# Patient Record
Sex: FEMALE | Race: WHITE | NOT HISPANIC OR LATINO | Employment: FULL TIME | URBAN - METROPOLITAN AREA
[De-identification: names, ages, dates, MRNs, and addresses within clinical notes are randomized per-mention and may not be internally consistent; named-entity substitution may affect disease eponyms.]

---

## 2017-06-19 LAB
HPV ABSTRACT: NORMAL
PAP-ABSTRACT: NORMAL

## 2018-12-06 ENCOUNTER — TRANSFERRED RECORDS (OUTPATIENT)
Dept: FAMILY MEDICINE | Facility: CLINIC | Age: 53
End: 2018-12-06

## 2021-07-02 ENCOUNTER — TRANSFERRED RECORDS (OUTPATIENT)
Dept: FAMILY MEDICINE | Facility: CLINIC | Age: 56
End: 2021-07-02

## 2021-07-02 LAB
HEP C HIM: NORMAL
HIV 1&2 EXT: NORMAL

## 2022-02-18 ENCOUNTER — OFFICE VISIT (OUTPATIENT)
Dept: FAMILY MEDICINE | Facility: CLINIC | Age: 57
End: 2022-02-18

## 2022-02-18 VITALS
BODY MASS INDEX: 22.68 KG/M2 | HEART RATE: 77 BPM | WEIGHT: 162 LBS | HEIGHT: 71 IN | RESPIRATION RATE: 16 BRPM | SYSTOLIC BLOOD PRESSURE: 110 MMHG | OXYGEN SATURATION: 96 % | DIASTOLIC BLOOD PRESSURE: 78 MMHG

## 2022-02-18 DIAGNOSIS — M54.31 SCIATICA OF RIGHT SIDE: Primary | ICD-10-CM

## 2022-02-18 PROBLEM — Z90.79 S/P TAH-BSO: Status: ACTIVE | Noted: 2018-06-20

## 2022-02-18 PROBLEM — G62.0 POLYNEUROPATHY DUE TO DRUG (H): Status: ACTIVE | Noted: 2018-04-30

## 2022-02-18 PROBLEM — J30.9 ALLERGIC RHINITIS: Status: ACTIVE | Noted: 2022-02-18

## 2022-02-18 PROBLEM — Z90.710 S/P TAH-BSO: Status: ACTIVE | Noted: 2018-06-20

## 2022-02-18 PROBLEM — C54.1 CARCINOMA OF ENDOMETRIUM (H): Status: ACTIVE | Noted: 2017-07-05

## 2022-02-18 PROBLEM — Z90.722 S/P TAH-BSO: Status: ACTIVE | Noted: 2018-06-20

## 2022-02-18 PROCEDURE — 99203 OFFICE O/P NEW LOW 30 MIN: CPT | Performed by: FAMILY MEDICINE

## 2022-02-18 RX ORDER — TRETINOIN 0.25 MG/G
CREAM TOPICAL
COMMUNITY
Start: 2021-06-22

## 2022-02-18 RX ORDER — CYCLOBENZAPRINE HCL 10 MG
10 TABLET ORAL 3 TIMES DAILY PRN
Qty: 30 TABLET | Refills: 0 | Status: SHIPPED | OUTPATIENT
Start: 2022-02-18 | End: 2024-03-05

## 2022-02-18 RX ORDER — SOLIFENACIN SUCCINATE 5 MG/1
5 TABLET, FILM COATED ORAL
COMMUNITY
Start: 2021-07-11 | End: 2022-10-07

## 2022-02-18 RX ORDER — SERTRALINE HYDROCHLORIDE 100 MG/1
100 TABLET, FILM COATED ORAL
COMMUNITY
Start: 2021-07-01 | End: 2022-09-02

## 2022-02-18 RX ORDER — TRAZODONE HYDROCHLORIDE 100 MG/1
100-200 TABLET ORAL
COMMUNITY
Start: 2021-11-29 | End: 2022-09-02

## 2022-02-18 NOTE — PATIENT INSTRUCTIONS
"  Patient Education     * Sciatica     Sciatica (\"Lumbar Radiculopathy\") causes a pain that spreads from the lower back down into the buttock, hip and leg. Sometimes leg pain can occur without any back pain. Sciatica is due to irritation or pressure on a spinal nerve as it comes out of the spinal canal. This is most often due to pressure on the nerve from a nearby spinal disk (the cartilage cushion between each spinal bone). Other causes include spinal stenosis (narrowing of the spinal canal) and spasm of the piriformis muscle (a muscle in the buttocks that the sciatic nerve passes through).  Sciatica may begin after a sudden twisting/bending force (such as in a car accident), or sometimes after a simple awkward movement. In either case, muscle spasm is commonly present and can add to the pain.  The diagnosis of sciatica is made from the symptoms and physical exam. Unless you had a physical injury (such as a car accident or fall), X-rays are usually not ordered for the initial evaluation of sciatica because the nerves and disks cannot be seen on an X-ray. Most sciatica (80-90%) gets better with time.  What can I do about my low back pain?  There are three main things you can do to ease low back pain and help it go away.    Stay active! Use positions, movements and exercises. Too much rest can make your symptoms worse.    Use heat or cold packs.    Take medicine as directed.  Using positions, movements and exercises  Research tells us that moving your joints and muscles can help you recover from back pain. Such activity should be simple and gentle.  Use walking to help relieve your discomfort. Try taking a short walk every 3 to 4 hours during the day. Walk for a few minutes inside your home or take longer walks outside, on a treadmill or at a mall. Slowly increase the amount of time you walk. Expect discomfort when you begin, but it should lessen as your back starts to recover.  Finding a position that is " comfortable  When your back pain is new, you may find that certain positions will ease your pain. Gently try each of the following positions until you find one that eases your pain. Once you find a position of comfort, use it as often as you like while you recover. Return to your daily routine as soon as possible.    Lie on your back with your legs bent. You can do this by placing a pillow under your knees or lie on the floor and rest your lower legs on the seat of a chair.    Lie on your side with your knees bent and place a pillow between your knees.    Lie on your stomach over pillows.  Using heat or cold packs  Try cold packs or gentle heat to ease your pain. Use whichever gives you the most relief. Apply the cold pack or heat for 15 minutes at a time, as often as needed.  Taking medicine  If taking over-the-counter medicine:    Take ibuprofen (Advil, Motrin) 600 mg. three times a day as needed for pain.  OR    Take Aleve (naproxen sodium) 220 to 440 mg. two times a day as needed for pain.  If your doctor prescribed medicine, follow the instructions. Stop taking the medicine as soon as you can.  When should I call my doctor?  Your back pain should improve over the first couple of weeks. As it improves, you should be able to return to your normal activities. But call your doctor if:    You have a sudden change in your ability to control?your bladder or bowels.    You begin to feel tingling in your groin or legs.    The pain spreads down your leg and into your foot.    Your toes, feet or leg muscles begin to feel weak.    You feel generally unwell or sick.    Your pain gets worse.  For informational purposes only. Not to replace the advice of your health care provider.  Copyright   2018 [employer]. All rights reserved.
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
## 2022-03-10 ENCOUNTER — OFFICE VISIT (OUTPATIENT)
Dept: FAMILY MEDICINE | Facility: CLINIC | Age: 57
End: 2022-03-10

## 2022-03-10 VITALS
OXYGEN SATURATION: 98 % | SYSTOLIC BLOOD PRESSURE: 114 MMHG | BODY MASS INDEX: 23.01 KG/M2 | WEIGHT: 165 LBS | DIASTOLIC BLOOD PRESSURE: 84 MMHG | HEART RATE: 73 BPM

## 2022-03-10 DIAGNOSIS — M54.10 RADICULAR PAIN OF RIGHT LOWER EXTREMITY: Primary | ICD-10-CM

## 2022-03-10 DIAGNOSIS — Z90.79 S/P TAH-BSO: ICD-10-CM

## 2022-03-10 DIAGNOSIS — Z90.710 S/P TAH-BSO: ICD-10-CM

## 2022-03-10 DIAGNOSIS — M54.31 SCIATICA OF RIGHT SIDE: ICD-10-CM

## 2022-03-10 DIAGNOSIS — Z85.42 H/O MALIGNANT NEOPLASM OF ENDOMETRIUM: ICD-10-CM

## 2022-03-10 DIAGNOSIS — Z90.722 S/P TAH-BSO: ICD-10-CM

## 2022-03-10 PROCEDURE — 99214 OFFICE O/P EST MOD 30 MIN: CPT | Performed by: FAMILY MEDICINE

## 2022-03-10 RX ORDER — GABAPENTIN 100 MG/1
100 CAPSULE ORAL 3 TIMES DAILY
Qty: 60 CAPSULE | Refills: 0 | Status: SHIPPED | OUTPATIENT
Start: 2022-03-10 | End: 2022-03-29

## 2022-03-10 RX ORDER — PREDNISONE 20 MG/1
40 TABLET ORAL DAILY
Qty: 10 TABLET | Refills: 0 | Status: SHIPPED | OUTPATIENT
Start: 2022-03-10 | End: 2022-03-15

## 2022-03-10 RX ORDER — METHYLPREDNISOLONE 4 MG
TABLET, DOSE PACK ORAL
Qty: 21 TABLET | Refills: 0 | Status: CANCELLED | OUTPATIENT
Start: 2022-03-10
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
## 2022-03-29 DIAGNOSIS — M54.31 SCIATICA OF RIGHT SIDE: ICD-10-CM

## 2022-03-29 DIAGNOSIS — M54.10 RADICULAR PAIN OF RIGHT LOWER EXTREMITY: ICD-10-CM

## 2022-03-29 RX ORDER — GABAPENTIN 100 MG/1
100 CAPSULE ORAL 3 TIMES DAILY
Qty: 90 CAPSULE | Refills: 0 | Status: SHIPPED | OUTPATIENT
Start: 2022-03-29 | End: 2024-03-05

## 2022-03-29 NOTE — TELEPHONE ENCOUNTER
Refill for Gabapentin  Last fill was 3/10/22 60 tablets that covers 20 days when pt is taking it 3 times daily needs 90 tablets   LOV 3/10/22  Radicular pain of right lower extremity  S/P GOMEZ-BSO  H/O malignant neoplasm of endometrium  Sciatica of right side  Right sided acute lumbosacral radiculopathy. Has MRI lumbar spine scheduled for early next week : ordered by orthopedics.    Presents for pain management today.    Continue tylenol, OTC NSAID, salon pas, TENS machine  Trial of prednisone burst (advised may provide partial pain relief) and analgesia for neurogenic pain  Discussed potential sedative effect of gabapentin : if causes excessive daytime somnolence can reserve for use at bedtime.   Present to ED with any progressive numbness, weakness or incontinence.  -     gabapentin (NEURONTIN) 100 MG capsule; Take 1 capsule (100 mg) by mouth 3 times daily  -     predniSONE (DELTASONE) 20 MG tablet; Take 2 tablets (40 mg) by mouth daily for 5 days

## 2022-04-14 ENCOUNTER — OFFICE VISIT (OUTPATIENT)
Dept: FAMILY MEDICINE | Facility: CLINIC | Age: 57
End: 2022-04-14

## 2022-04-14 VITALS
BODY MASS INDEX: 23.1 KG/M2 | WEIGHT: 165 LBS | OXYGEN SATURATION: 97 % | SYSTOLIC BLOOD PRESSURE: 122 MMHG | HEIGHT: 71 IN | DIASTOLIC BLOOD PRESSURE: 82 MMHG | HEART RATE: 87 BPM

## 2022-04-14 DIAGNOSIS — Z90.79 S/P TAH-BSO: ICD-10-CM

## 2022-04-14 DIAGNOSIS — Z90.710 S/P TAH-BSO: ICD-10-CM

## 2022-04-14 DIAGNOSIS — Z90.722 S/P TAH-BSO: ICD-10-CM

## 2022-04-14 DIAGNOSIS — Z85.42 H/O MALIGNANT NEOPLASM OF ENDOMETRIUM: ICD-10-CM

## 2022-04-14 DIAGNOSIS — R31.29 MICROSCOPIC HEMATURIA: Primary | ICD-10-CM

## 2022-04-14 LAB
BACTERIA URINE: 0
BILIRUB UR QL STRIP: ABNORMAL
BLOOD URINE DIP: ABNORMAL
CASTS/LPF: 0
COLOR UR: YELLOW
CRYSTAL URINE: 0
EPITHELIAL CELLS - QUEST: ABNORMAL
GLUCOSE UR STRIP-MCNC: ABNORMAL MG/DL
KETONES UR QL STRIP: ABNORMAL
LEUKOCYTE ESTERASE URINE DIP: ABNORMAL
MUCOUS URINE: 0
NITRITE UR QL STRIP: ABNORMAL
PH UR STRIP: 7 PH (ref 5–9)
PROT UR QL: ABNORMAL MG/DL (ref ?–0.01)
RBC URINE: 0.3 (ref 0–3)
SP GR UR STRIP: 1.01 (ref 1–1.02)
UROBILINOGEN UR QL STRIP: 0.2 EU/DL (ref 0.2–1)
WBC URINE: ABNORMAL (ref 0–3)

## 2022-04-14 PROCEDURE — 81003 URINALYSIS AUTO W/O SCOPE: CPT | Performed by: FAMILY MEDICINE

## 2022-04-14 PROCEDURE — 99214 OFFICE O/P EST MOD 30 MIN: CPT | Performed by: FAMILY MEDICINE

## 2022-04-14 NOTE — PROGRESS NOTES
"SUBJECTIVE:    Shelby Suazo, is a 56 year old female with Hx of stage IV grade 2 endometrial cancer now in remission for 4 years presenting for the below:     1. Urgent care follow up. Seen at [place] urgent care (those outside records are not available at the time of writing) 3/25 with lower abdominal bloating and discomfort associated with offensive smelling / stong urine. No associated dysuria, frequency, urgency.  As per patient report, trace blood in urine only. Was issued with Abx from urgent care (patient thinks this was Keflex). Called by urgent care 2 days later and informed urine culture with no growth. Stopped taking Abx. Lower abdominal discomfort has persisted intermittent. She is particularly concerned as her endometrial cancer was initially identified through microscopic hematuria finding.     Most recent surveillance scan June 2021 : continued to be in remission at that time. Has 6 month follow up next week at [facility], 4/20 being seen by Elida Antonio NP.     OBJECTIVE:  Vitals:    04/14/22 1048   BP: 122/82   Pulse: 87   SpO2: 97%   Weight: 74.8 kg (165 lb)   Height: 1.803 m (5' 11\")    Body mass index is 23.01 kg/m .    General: no acute distress, cooperative with exam.  Psych: mental status normal, mood and affect appropriate.    UA RESULTS:  Recent Labs   Lab Test 04/14/22  0000   COLOR Yellow   SG 1.015   URINEPH 7.0   UROBILINOGEN 0.2   NITRITE Neg   RBCU 0.3*   WBCU rare         ASSESSMENT / PLAN:        Microscopic hematuria  S/P GOMEZ-BSO  H/O malignant neoplasm of endometrium    56 y.o. female with Hx of stage IV grade 2 endometrial cancer now in remission for 4 years presenting with isolated persistent microscopic hematuria. Urine culture with no growth. Patient is particularly concerned for neoplastic recurrence as microscopic hematuria was initial presenting sign ultimately leading to Dx of endometrial cancer. Most recent surveillance scan June 2021 : continued to be in remission at that " time.  Next interval surveillance imaging due November time (at 5 year rosita).  Together we called her [facility] care team and spoke with team member. Patient to keep appointment at [facility] next week 4/20.      -     Urinalysis ([facility])

## 2022-04-19 NOTE — PROGRESS NOTES
4/14/22 Faxed this office note with 4/14/22 lab results to [facility]ology department @ [phone]    Yemi Nieto,   [facility]  [phone]

## 2022-05-03 ENCOUNTER — TRANSFERRED RECORDS (OUTPATIENT)
Dept: FAMILY MEDICINE | Facility: CLINIC | Age: 57
End: 2022-05-03

## 2022-07-19 DIAGNOSIS — G47.00 INSOMNIA: Primary | ICD-10-CM

## 2022-07-19 RX ORDER — TRAZODONE HYDROCHLORIDE 100 MG/1
150 TABLET ORAL AT BEDTIME
Qty: 5 TABLET | Refills: 0 | Status: SHIPPED | OUTPATIENT
Start: 2022-07-19 | End: 2022-09-02

## 2022-07-19 NOTE — TELEPHONE ENCOUNTER
Patient calls requesting small quantity of trazodone 100mg tabs be sent to pharmacy in [facility]. Is there visiting daughter. They are on a short trip within [place] and patient forgot her pills at daughter's home. States takes 150mg q hs and would like 5 tabs sent if possible.   Last visit: 4/14/22 with Dr. Rob     Plan: okay per Dr. Reddy (oncall for Dr. Rob) to sent 5 tabs trazodone to pharmacy per patient request. This was done. Patient appreciative.   Alie Gipson RN

## 2022-08-30 NOTE — PROGRESS NOTES
. Female Preventive Health Visit    SUBJECTIVE:    This 57 year old female presenting for a routine preventive physical exam.    The patient has the following concerns:     1. H/o stage 4 endometrial cancer. S/p hysterectomy with BSO July 2017.  Has 5 year final follow up [facility] in December : will be discharge to primary care after this. Needs to continue with annual vaginal vault PAPs.      2. Hair loss with chemotherapy. Has been using topical minoxidil with good effect and hair regrowth. Interested in TSH testing today.    3. GIBRAN : Zoloft 100 mg daily. 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 Would like to increase ssri dose.     Patient's medications, allergies, past medical, surgical and family histories were reviewed and updated as appropriate.    OB/Gyn History:      Pap Smear: due annual vaginal vault PAP.      Health Maintenance:  Health maintenance alerts were reviewed and updated as appropriate.  Breast cancer screening: Due.   Osteoporosis screening:  Aug 2019 : Mildly low bone density (osteopenia) based on the lowest T-score at the lumbar spine. Repeat in 5 years.     Colorectal cancer screening: colonscopy at age 50.       Healthy Habits:    Do you get at least three servings of calcium containing foods daily (dairy, green leafy vegetables, etc.)? no, taking calcium and/or vitamin D supplement    Amount of exercise or daily activities, outside of work: 3 day(s) per week    Problems taking medications regularly No    Medication side effects: No    Have you had an eye exam in the past two years? yes    Do you see a dentist twice per year? yes    Do you have sleep apnea, excessive snoring or daytime drowsiness?no    Hearing Screening:  Right Ear  4000Hz: pass  2000Hz: pass  1000Hz: pass  500Hz: pass    Left Ear  4000Hz: pass  2000Hz: pass  1000Hz: pass         500Hz: pass      OBJECTIVE:    Vitals:    09/02/22 1046   BP: 114/78   Pulse: 63  "  Resp: 16   SpO2: 98%   Weight: 73.1 kg (161 lb 2 oz)   Height: 1.791 m (5' 10.5\")    Body mass index is 22.79 kg/m .    General: no acute distress, cooperative with exam.  HEENT:  PERRLA. Bilateral TM's, external canals, oropharynx normal.    Neck:  Supple, no lymphadenopathy or thyromegaly   Breasts: breasts appear normal, no suspicious masses, no skin or nipple changes  Lungs: clear to auscultation bilaterally, normal respiratory effort.  Heart:  RRR without murmurs, rubs or gallops.  Normal S1 and S2.  Abdomen: normal bowel sounds, nontender, no palpable organomegaly.  Genitourinary: normal external genitalia, vulva, vagina.   Skin:  No lesions.  No rashes.  Extremities: warm, perfused, no swelling or edema.  Neuro:  CN II-XII intact, motor & sensory function all intact.    Psych: mental status normal, mood and affect appropriate.    PHQ 9/2/2022   PHQ-9 Total Score 1   Q9: Thoughts of better off dead/self-harm past 2 weeks Not at all       ASSESSMENT / PLAN:  This 57 year old female presents for a routine preventive physical exam. Preventive health topics discussed including adequate exercise and healthy diet. Return to clinic in one year for preventative exam or sooner with any other concerns.  Other issues discussed as noted below.        Routine general medical examination at a health care facility  -     VENOUS COLLECTION  -     Hemoglobin A1C ([facility])  -     Lipid Panel ([facility])    Primary insomnia  -     traZODone (DESYREL) 100 MG tablet; Take 1.5 tablets (150 mg) by mouth At Bedtime    Breast cancer screening by mammogram  -     MA Screen Bilateral w/[facility]; Future    Loss of hair  -     TSH ([facility])    GIBRAN (generalized anxiety disorder)  After discussion will increase Zoloft to 150 mg daily.   -     sertraline (ZOLOFT) 100 MG tablet; Take 1.5 tablets (150 mg) by mouth daily for 90 days    H/O malignant neoplasm of endometrium  S/P GOMEZ-BSO  Has 5 year final follow up [facility] in December : will be " discharge to primary care after this. Needs to continue with annual vaginal vault PAPs.  Completed today.   -     Pap IG HPV HR and LR ([facility])

## 2022-09-02 ENCOUNTER — OFFICE VISIT (OUTPATIENT)
Dept: FAMILY MEDICINE | Facility: CLINIC | Age: 57
End: 2022-09-02

## 2022-09-02 VITALS
OXYGEN SATURATION: 98 % | DIASTOLIC BLOOD PRESSURE: 78 MMHG | HEIGHT: 71 IN | RESPIRATION RATE: 16 BRPM | BODY MASS INDEX: 22.56 KG/M2 | HEART RATE: 63 BPM | WEIGHT: 161.13 LBS | SYSTOLIC BLOOD PRESSURE: 114 MMHG

## 2022-09-02 DIAGNOSIS — F51.01 PRIMARY INSOMNIA: ICD-10-CM

## 2022-09-02 DIAGNOSIS — Z12.31 BREAST CANCER SCREENING BY MAMMOGRAM: ICD-10-CM

## 2022-09-02 DIAGNOSIS — Z00.00 ROUTINE GENERAL MEDICAL EXAMINATION AT A HEALTH CARE FACILITY: Primary | ICD-10-CM

## 2022-09-02 DIAGNOSIS — L65.9 LOSS OF HAIR: ICD-10-CM

## 2022-09-02 DIAGNOSIS — F41.1 GAD (GENERALIZED ANXIETY DISORDER): ICD-10-CM

## 2022-09-02 DIAGNOSIS — Z90.79 S/P TAH-BSO: ICD-10-CM

## 2022-09-02 DIAGNOSIS — Z90.722 S/P TAH-BSO: ICD-10-CM

## 2022-09-02 DIAGNOSIS — Z90.710 S/P TAH-BSO: ICD-10-CM

## 2022-09-02 DIAGNOSIS — Z85.42 H/O MALIGNANT NEOPLASM OF ENDOMETRIUM: ICD-10-CM

## 2022-09-02 PROBLEM — C54.1 CARCINOMA OF ENDOMETRIUM (H): Status: ACTIVE | Noted: 2017-07-05

## 2022-09-02 PROCEDURE — 36415 COLL VENOUS BLD VENIPUNCTURE: CPT | Performed by: FAMILY MEDICINE

## 2022-09-02 PROCEDURE — 99396 PREV VISIT EST AGE 40-64: CPT | Performed by: FAMILY MEDICINE

## 2022-09-02 PROCEDURE — 99214 OFFICE O/P EST MOD 30 MIN: CPT | Mod: 25 | Performed by: FAMILY MEDICINE

## 2022-09-02 RX ORDER — SERTRALINE HYDROCHLORIDE 100 MG/1
150 TABLET, FILM COATED ORAL DAILY
Qty: 135 TABLET | Refills: 3 | Status: SHIPPED | OUTPATIENT
Start: 2022-09-02 | End: 2023-09-07

## 2022-09-02 RX ORDER — GABAPENTIN 100 MG/1
100 CAPSULE ORAL 3 TIMES DAILY
Qty: 90 CAPSULE | Refills: 0 | Status: CANCELLED | OUTPATIENT
Start: 2022-09-02

## 2022-09-02 RX ORDER — TRAZODONE HYDROCHLORIDE 100 MG/1
150 TABLET ORAL AT BEDTIME
Qty: 135 TABLET | Refills: 1 | Status: SHIPPED | OUTPATIENT
Start: 2022-09-02 | End: 2023-09-27

## 2022-09-02 RX ORDER — POLYETHYLENE GLYCOL 3350 17 G/17G
POWDER, FOR SOLUTION ORAL
COMMUNITY

## 2022-09-02 RX ORDER — BIMATOPROST 3 UG/ML
1 SOLUTION TOPICAL
COMMUNITY

## 2022-09-02 ASSESSMENT — ANXIETY QUESTIONNAIRES
7. FEELING AFRAID AS IF SOMETHING AWFUL MIGHT HAPPEN: NOT AT ALL
5. BEING SO RESTLESS THAT IT IS HARD TO SIT STILL: NOT AT ALL
1. FEELING NERVOUS, ANXIOUS, OR ON EDGE: NOT AT ALL
2. NOT BEING ABLE TO STOP OR CONTROL WORRYING: NOT AT ALL
3. WORRYING TOO MUCH ABOUT DIFFERENT THINGS: NOT AT ALL
GAD7 TOTAL SCORE: 0
GAD7 TOTAL SCORE: 0
6. BECOMING EASILY ANNOYED OR IRRITABLE: NOT AT ALL

## 2022-09-02 ASSESSMENT — PATIENT HEALTH QUESTIONNAIRE - PHQ9
SUM OF ALL RESPONSES TO PHQ QUESTIONS 1-9: 1
5. POOR APPETITE OR OVEREATING: NOT AT ALL

## 2022-09-03 LAB
CHOLEST SERPL-MCNC: 167 MG/DL (ref 100–199)
HBA1C MFR BLD: 5.7 % (ref 4.8–5.6)
HDLC SERPL-MCNC: 56 MG/DL
LDL/HDL RATIO: 1.7 RATIO (ref 0–3.2)
LDLC SERPL CALC-MCNC: 95 MG/DL (ref 0–99)
TRIGL SERPL-MCNC: 86 MG/DL (ref 0–149)
TSH BLD-ACNC: 3.94 UIU/ML (ref 0.45–4.5)
VLDLC SERPL CALC-MCNC: 16 MG/DL (ref 5–40)

## 2022-09-09 LAB
.: NORMAL
CLINICIAN PROVIDED ICD10: NORMAL
DIAGNOSIS:: NORMAL
HPV APTIMA: NEGATIVE
Lab: NORMAL
Lab: NORMAL
PERFORMED BY:: NORMAL
SPECIMEN ADEQUACY:: NORMAL
TEST METHODOLOGY:: NORMAL

## 2022-09-16 LAB
CHOLESTEROL (EXTERNAL): 198 MG/DL (ref 0–199)
HDLC SERPL-MCNC: 67 MG/DL (ref 39–90)
LDL CHOLESTEROL (EXTERNAL): 0 MG/DL (ref 0–0)
LDL CHOLESTEROL CALCULATED (EXTERNAL): 113 MG/DL (ref 19–130)
NON HDL CHOLESTEROL (EXTERNAL): 0 MG/DL (ref 0–0)
TRIGLYCERIDES (EXTERNAL): 90 MG/DL (ref 4–149)

## 2022-09-19 ENCOUNTER — TRANSFERRED RECORDS (OUTPATIENT)
Dept: FAMILY MEDICINE | Facility: CLINIC | Age: 57
End: 2022-09-19

## 2022-10-03 ENCOUNTER — HEALTH MAINTENANCE LETTER (OUTPATIENT)
Age: 57
End: 2022-10-03

## 2023-01-27 ENCOUNTER — TELEPHONE (OUTPATIENT)
Dept: FAMILY MEDICINE | Facility: CLINIC | Age: 58
End: 2023-01-27

## 2023-01-27 DIAGNOSIS — Z90.710 S/P TAH-BSO: Primary | ICD-10-CM

## 2023-01-27 DIAGNOSIS — Z90.722 S/P TAH-BSO: Primary | ICD-10-CM

## 2023-01-27 DIAGNOSIS — Z90.79 S/P TAH-BSO: Primary | ICD-10-CM

## 2023-01-27 DIAGNOSIS — J30.89 SEASONAL ALLERGIC RHINITIS DUE TO OTHER ALLERGIC TRIGGER: ICD-10-CM

## 2023-01-27 RX ORDER — SOLIFENACIN SUCCINATE 5 MG/1
5 TABLET, FILM COATED ORAL DAILY
Qty: 90 TABLET | Refills: 2 | Status: SHIPPED | OUTPATIENT
Start: 2023-01-27 | End: 2023-10-20

## 2023-01-27 RX ORDER — AZELASTINE 1 MG/ML
1 SPRAY, METERED NASAL 2 TIMES DAILY
Qty: 30 ML | Refills: 1 | Status: SHIPPED | OUTPATIENT
Start: 2023-01-27 | End: 2023-03-27

## 2023-01-27 NOTE — TELEPHONE ENCOUNTER
Patient called clinic requesting prescription for Vesicare and Azelastin. Patient last office visit 9/2/22 for CPX. Refills entered and routed to PCP Dr Rob for review. Cassie Ernandez

## 2023-03-27 DIAGNOSIS — J30.89 SEASONAL ALLERGIC RHINITIS DUE TO OTHER ALLERGIC TRIGGER: ICD-10-CM

## 2023-03-27 RX ORDER — AZELASTINE 1 MG/ML
1 SPRAY, METERED NASAL 2 TIMES DAILY
Qty: 30 ML | Refills: 1 | Status: SHIPPED | OUTPATIENT
Start: 2023-03-27 | End: 2023-06-05

## 2023-06-03 DIAGNOSIS — J30.89 SEASONAL ALLERGIC RHINITIS DUE TO OTHER ALLERGIC TRIGGER: ICD-10-CM

## 2023-06-05 RX ORDER — AZELASTINE HYDROCHLORIDE 137 UG/1
SPRAY, METERED NASAL
Qty: 30 ML | Refills: 3 | Status: SHIPPED | OUTPATIENT
Start: 2023-06-05 | End: 2024-02-19

## 2023-06-05 NOTE — TELEPHONE ENCOUNTER
Azelastine. LOV 9/02/22.    Seasonal allergic rhinitis due to other allergic trigger.     Per Dr Horne \"  

## 2023-08-30 ENCOUNTER — VIRTUAL VISIT (OUTPATIENT)
Dept: FAMILY MEDICINE | Facility: CLINIC | Age: 58
End: 2023-08-30

## 2023-08-30 DIAGNOSIS — R79.89 ELEVATED TSH: Primary | ICD-10-CM

## 2023-08-30 PROCEDURE — 99213 OFFICE O/P EST LOW 20 MIN: CPT | Mod: 95 | Performed by: FAMILY MEDICINE

## 2023-08-30 RX ORDER — LEVOTHYROXINE SODIUM 25 UG/1
25 TABLET ORAL DAILY
Qty: 90 TABLET | Refills: 3 | Status: SHIPPED | OUTPATIENT
Start: 2023-08-30 | End: 2023-09-27

## 2023-08-30 NOTE — PROGRESS NOTES
Length of video call : 8 mins    Patient location:  Home    Provider location:  [facility]     Mode of Communication:  Video Conference via [employer]    This was a virtual video-visit conducted during COVID-19 outbreak in regulation with social distancing and quarantine recommendations of the CDC and [place] department of health and human services. A two way audio/video connection was used in real time with patient's consent.    SUBJECTIVE:                                                 Shelby Suazo is a 58 year old female seen via video visit for the following health issues :    H/o stage 4 endometrial cancer. S/p hysterectomy with BSO July 2017.  Has completed 5 year maintenance with [facility]  Needs to continue with annual vaginal vault PAPs.      Recently seen at [facility] for mild cognitive decline (lost in prior familiar surroundings, tired all the time). Work up reassuring other than : found to have underactive thyroid. [facility] plan for neuropsychiatry testing.    TSH : mildly elevated at 6. TPO antibodies and T4 wnl. Patient interested in tying low dose synthroid.     OBJECTIVE:                                                    No vitals taken due to telehealth visit          ASSESSMENT/PLAN:                                                      Elevated TSH  Recheck TSH and symptomatology in 4 weeks at upcoming annual physical.   -     levothyroxine (SYNTHROID/LEVOTHROID) 25 MCG tablet; Take 1 tablet (25 mcg) by mouth daily

## 2023-09-07 DIAGNOSIS — F41.1 GAD (GENERALIZED ANXIETY DISORDER): ICD-10-CM

## 2023-09-07 NOTE — TELEPHONE ENCOUNTER
Med: Sertraline      LOV (related): 9/2/22      Due for F/U around: None       Next Appt: 9/27/23 with Liane               9/2/2022    12:07 PM   PHQ   PHQ-9 Total Score 1   Q9: Thoughts of better off dead/self-harm past 2 weeks Not at all           9/2/2022    12:07 PM   GIBRAN-7 SCORE   Total Score 0

## 2023-09-08 RX ORDER — SERTRALINE HYDROCHLORIDE 100 MG/1
150 TABLET, FILM COATED ORAL DAILY
Qty: 135 TABLET | Refills: 3 | Status: SHIPPED | OUTPATIENT
Start: 2023-09-08 | End: 2023-09-27

## 2023-09-23 ASSESSMENT — ENCOUNTER SYMPTOMS
DYSURIA: 0
SHORTNESS OF BREATH: 0
NERVOUS/ANXIOUS: 0
ARTHRALGIAS: 1
COUGH: 0
EYE PAIN: 0
DIZZINESS: 0
CONSTIPATION: 0
HEMATURIA: 0
ABDOMINAL PAIN: 0
DIARRHEA: 0
PARESTHESIAS: 0
SORE THROAT: 0
JOINT SWELLING: 0
NAUSEA: 0
PALPITATIONS: 0
HEARTBURN: 0
FEVER: 0
CHILLS: 0
WEAKNESS: 0
MYALGIAS: 0
HEMATOCHEZIA: 0
HEADACHES: 0
FREQUENCY: 0
BREAST MASS: 0

## 2023-09-26 NOTE — PROGRESS NOTES
. Female Preventive Health Visit    SUBJECTIVE:    This 57 year old female presenting for a routine preventive physical exam.    The patient has the following concerns:     1. H/o stage 4 endometrial cancer. S/p hysterectomy with BSO July 2017.  Has completed 5 years of surveillance with [facility] : discharged to primary care.. Needs to continue with annual vaginal vault PAPs.      2. Hypothyroidism : TSH found to be mildly elevated at 6 during work up at [facility] for congitive decline. TPO antibodies and T4 wnl. Synthroid  25 mcg daily for last 6 weeks. Due TSH recheck. Less cognitive fogginess and less aching of joints.     3. GIBRAN : Zoloft 150 mg daily. Feels stable on this. Takes 100 mg over the summer.     Patient's medications, allergies, past medical, surgical and family histories were reviewed and updated as appropriate.    OB/Gyn History:      Pap Smear: due annual vaginal vault PAP.    Health Maintenance:  Health maintenance alerts were reviewed and updated as appropriate.  Breast cancer screening: Due December 2023.   Osteoporosis screening:  Aug 2019 : Mildly low bone density (osteopenia) based on the lowest T-score at the lumbar spine. Repeat in 5 years (2024)    Colorectal cancer screening: colonscopy at age 50.     Healthy Habits:  Answers submitted by the patient for this visit:  Annual Preventive Visit (Submitted on 9/23/2023)  Chief Complaint: Annual Exam:  Frequency of exercise:: 2-3 days/week  Getting at least 3 servings of Calcium per day:: Yes  Diet:: Vegetarian/vegan  Taking medications regularly:: Yes  Bi-annual eye exam:: Yes  Dental care twice a year:: Yes  Sleep apnea or symptoms of sleep apnea:: Daytime drowsiness  arthralgias: Yes    Exercise outside of work (Submitted on 9/23/2023)  Chief Complaint: Annual Exam:  Duration of exercise:: 30-45 minutes    Hearing Screening:  Right Ear  4000Hz: pass  2000Hz: pass  1000Hz: pass  500Hz: pass    Left Ear  4000Hz: pass  2000Hz: pass  1000Hz:  "pass         500Hz: pass      OBJECTIVE:    Vitals:    09/27/23 1540   BP: (!) 151/89   Pulse: 60   SpO2: 98%   Weight: 70.4 kg (155 lb 3.2 oz)   Height: 1.778 m (5' 10\")    Body mass index is 22.27 kg/m .    General: no acute distress, cooperative with exam.  HEENT:  PERRLA. Bilateral TM's, external canals, oropharynx normal.    Neck:  Supple, no lymphadenopathy or thyromegaly   Lungs: clear to auscultation bilaterally, normal respiratory effort.  Heart:  RRR without murmurs, rubs or gallops.  Normal S1 and S2.  Abdomen: normal bowel sounds, nontender, no palpable organomegaly.  Genitourinary: normal external genitalia, vulva, vaginal vault with no lesions.   Skin:  No lesions.  No rashes.  Extremities: warm, perfused, no swelling or edema.  Neuro:  CN II-XII intact, motor & sensory function all intact.    Psych: mental status normal, mood and affect appropriate.        9/2/2022    12:07 PM   PHQ   PHQ-9 Total Score 1   Q9: Thoughts of better off dead/self-harm past 2 weeks Not at all       ASSESSMENT / PLAN:  This 57 year old female presents for a routine preventive physical exam. Preventive health topics discussed including adequate exercise and healthy diet. Return to clinic in one year for preventative exam or sooner with any other concerns.  Other issues discussed as noted below.      Routine general medical examination at a health care facility    Hypothyroidism, unspecified type   TSH found to be mildly elevated at 6 during work up at [place] [facility] for congitive decline. TPO antibodies and T4 wnl. Synthroid  25 mcg daily for last 6 weeks. Due TSH recheck. Less cognitive fogginess and less aching of joints.   -     TSH; Future  -     levothyroxine (SYNTHROID/LEVOTHROID) 25 MCG tablet; Take 1 tablet (25 mcg) by mouth daily    H/O malignant neoplasm of endometrium  Pap Smear: due annual vaginal vault PAP.  -     CBC with Diff/Plt ([facility])  -     Comprehensive metabolic panel; Future  -     Gynecologic Cytology (PAP); " Future    Screening for cardiovascular condition  -     Lipid Profile ([facility])  -     VENOUS COLLECTION    Prediabetes  -     Hemoglobin A1c; Future    Breast cancer screening by mammogram  -     MA Screen Bilateral w/[facility]; Future    Needs flu shot  -     INFLUENZA,INJ,MDCK,PF,QUAD >6MO(FLUCELVAX)    GIBRAN (generalized anxiety disorder)  -     sertraline (ZOLOFT) 100 MG tablet; Take 1.5 tablets (150 mg) by mouth daily Take 1.5 tablets (150 mg) by mouth daily for 90 days    Primary insomnia  -     traZODone (DESYREL) 100 MG tablet; Take 1.5 tablets (150 mg) by mouth At Bedtime                   not applicable (Male)

## 2023-09-27 ENCOUNTER — OFFICE VISIT (OUTPATIENT)
Dept: FAMILY MEDICINE | Facility: CLINIC | Age: 58
End: 2023-09-27

## 2023-09-27 VITALS
DIASTOLIC BLOOD PRESSURE: 89 MMHG | HEART RATE: 60 BPM | HEIGHT: 70 IN | SYSTOLIC BLOOD PRESSURE: 151 MMHG | OXYGEN SATURATION: 98 % | WEIGHT: 155.2 LBS | BODY MASS INDEX: 22.22 KG/M2

## 2023-09-27 DIAGNOSIS — Z00.00 ROUTINE GENERAL MEDICAL EXAMINATION AT A HEALTH CARE FACILITY: Primary | ICD-10-CM

## 2023-09-27 DIAGNOSIS — E03.9 HYPOTHYROIDISM, UNSPECIFIED TYPE: ICD-10-CM

## 2023-09-27 DIAGNOSIS — F51.01 PRIMARY INSOMNIA: ICD-10-CM

## 2023-09-27 DIAGNOSIS — F41.1 GAD (GENERALIZED ANXIETY DISORDER): ICD-10-CM

## 2023-09-27 DIAGNOSIS — Z12.31 BREAST CANCER SCREENING BY MAMMOGRAM: ICD-10-CM

## 2023-09-27 DIAGNOSIS — Z23 NEEDS FLU SHOT: ICD-10-CM

## 2023-09-27 DIAGNOSIS — Z13.6 SCREENING FOR CARDIOVASCULAR CONDITION: ICD-10-CM

## 2023-09-27 DIAGNOSIS — R73.03 PREDIABETES: ICD-10-CM

## 2023-09-27 DIAGNOSIS — Z85.42 H/O MALIGNANT NEOPLASM OF ENDOMETRIUM: ICD-10-CM

## 2023-09-27 LAB
% GRANULOCYTES: 62.3 % (ref 42.2–75.2)
ALBUMIN SERPL BCG-MCNC: 4.5 G/DL (ref 3.5–5.2)
ALP SERPL-CCNC: 84 U/L (ref 35–104)
ALT SERPL W P-5'-P-CCNC: 21 U/L (ref 0–50)
ANION GAP SERPL CALCULATED.3IONS-SCNC: 10 MMOL/L (ref 7–15)
AST SERPL W P-5'-P-CCNC: 25 U/L (ref 0–45)
BILIRUB SERPL-MCNC: 0.2 MG/DL
BUN SERPL-MCNC: 10.4 MG/DL (ref 8–23)
CALCIUM SERPL-MCNC: 10 MG/DL (ref 8.6–10)
CHLORIDE SERPL-SCNC: 103 MMOL/L (ref 98–107)
CHOLESTEROL: 179 MG/DL (ref 100–199)
CREAT SERPL-MCNC: 0.62 MG/DL (ref 0.51–0.95)
DEPRECATED HCO3 PLAS-SCNC: 28 MMOL/L (ref 22–29)
EGFRCR SERPLBLD CKD-EPI 2021: >90 ML/MIN/1.73M2
FASTING?: NO
GLUCOSE SERPL-MCNC: 96 MG/DL (ref 70–99)
HBA1C MFR BLD: 5.5 %
HCT VFR BLD AUTO: 36.3 % (ref 35–46)
HDL (RMG): 47 MG/DL (ref 40–?)
HEMOGLOBIN: 11.9 G/DL (ref 11.8–15.5)
LDL CALCULATED (RMG): 120 MG/DL (ref 0–130)
LYMPHOCYTES NFR BLD AUTO: 30.5 % (ref 20.5–51.1)
MCH RBC QN AUTO: 29.6 PG (ref 27–31)
MCHC RBC AUTO-ENTMCNC: 32.9 G/DL (ref 33–37)
MCV RBC AUTO: 89.8 FL (ref 80–100)
MONOCYTES NFR BLD AUTO: 7.2 % (ref 1.7–9.3)
PLATELET # BLD AUTO: 199 K/UL (ref 140–450)
POTASSIUM SERPL-SCNC: 3.8 MMOL/L (ref 3.4–5.3)
PROT SERPL-MCNC: 7 G/DL (ref 6.4–8.3)
RBC # BLD AUTO: 4.04 X10/CMM (ref 3.7–5.2)
SODIUM SERPL-SCNC: 141 MMOL/L (ref 135–145)
TRIGLYCERIDES (RMG): 56 MG/DL (ref 0–149)
TSH SERPL DL<=0.005 MIU/L-ACNC: 3.09 UIU/ML (ref 0.3–4.2)
WBC # BLD AUTO: 5 X10/CMM (ref 3.8–11)

## 2023-09-27 PROCEDURE — 84443 ASSAY THYROID STIM HORMONE: CPT | Performed by: FAMILY MEDICINE

## 2023-09-27 PROCEDURE — 87624 HPV HI-RISK TYP POOLED RSLT: CPT | Performed by: FAMILY MEDICINE

## 2023-09-27 PROCEDURE — 80053 COMPREHEN METABOLIC PANEL: CPT | Performed by: FAMILY MEDICINE

## 2023-09-27 PROCEDURE — 99396 PREV VISIT EST AGE 40-64: CPT | Performed by: FAMILY MEDICINE

## 2023-09-27 PROCEDURE — G0145 SCR C/V CYTO,THINLAYER,RESCR: HCPCS | Performed by: FAMILY MEDICINE

## 2023-09-27 PROCEDURE — 80061 LIPID PANEL: CPT | Mod: QW | Performed by: FAMILY MEDICINE

## 2023-09-27 PROCEDURE — 36415 COLL VENOUS BLD VENIPUNCTURE: CPT | Performed by: FAMILY MEDICINE

## 2023-09-27 PROCEDURE — 83036 HEMOGLOBIN GLYCOSYLATED A1C: CPT | Performed by: FAMILY MEDICINE

## 2023-09-27 PROCEDURE — 85025 COMPLETE CBC W/AUTO DIFF WBC: CPT | Performed by: FAMILY MEDICINE

## 2023-09-27 PROCEDURE — 90674 CCIIV4 VAC NO PRSV 0.5 ML IM: CPT | Performed by: FAMILY MEDICINE

## 2023-09-27 PROCEDURE — 90471 IMMUNIZATION ADMIN: CPT | Mod: 59 | Performed by: FAMILY MEDICINE

## 2023-09-27 PROCEDURE — 99214 OFFICE O/P EST MOD 30 MIN: CPT | Mod: 25 | Performed by: FAMILY MEDICINE

## 2023-09-27 RX ORDER — LEVOTHYROXINE SODIUM 25 UG/1
25 TABLET ORAL DAILY
Qty: 90 TABLET | Refills: 3 | Status: SHIPPED | OUTPATIENT
Start: 2023-09-27 | End: 2024-03-06

## 2023-09-27 RX ORDER — SERTRALINE HYDROCHLORIDE 100 MG/1
150 TABLET, FILM COATED ORAL DAILY
Qty: 135 TABLET | Refills: 3 | Status: SHIPPED | OUTPATIENT
Start: 2023-09-27

## 2023-09-27 RX ORDER — TRAZODONE HYDROCHLORIDE 100 MG/1
150 TABLET ORAL AT BEDTIME
Qty: 135 TABLET | Refills: 1 | Status: SHIPPED | OUTPATIENT
Start: 2023-09-27 | End: 2024-03-18

## 2023-10-02 LAB
BKR LAB AP GYN ADEQUACY: NORMAL
BKR LAB AP GYN INTERPRETATION: NORMAL
BKR LAB AP HPV REFLEX: NORMAL
BKR LAB AP PREVIOUS ABNORMAL: NORMAL
PATH REPORT.COMMENTS IMP SPEC: NORMAL
PATH REPORT.COMMENTS IMP SPEC: NORMAL
PATH REPORT.RELEVANT HX SPEC: NORMAL

## 2023-10-06 LAB
HUMAN PAPILLOMA VIRUS 16 DNA: NEGATIVE
HUMAN PAPILLOMA VIRUS 18 DNA: NEGATIVE
HUMAN PAPILLOMA VIRUS FINAL DIAGNOSIS: NORMAL
HUMAN PAPILLOMA VIRUS OTHER HR: NEGATIVE

## 2023-10-20 DIAGNOSIS — Z90.710 S/P TAH-BSO: ICD-10-CM

## 2023-10-20 DIAGNOSIS — Z90.79 S/P TAH-BSO: ICD-10-CM

## 2023-10-20 DIAGNOSIS — Z90.722 S/P TAH-BSO: ICD-10-CM

## 2023-10-20 RX ORDER — SOLIFENACIN SUCCINATE 5 MG/1
5 TABLET, FILM COATED ORAL DAILY
Qty: 90 TABLET | Refills: 3 | Status: SHIPPED | OUTPATIENT
Start: 2023-10-20

## 2023-10-20 NOTE — CONFIDENTIAL NOTE
Med: SOLIFENACIN    LOV (related): 9/27/23 -CPX      Due for F/U around: 9/2024 FOR CPX    Next Appt: NONE

## 2023-12-18 ENCOUNTER — ANCILLARY PROCEDURE (OUTPATIENT)
Dept: GENERAL RADIOLOGY | Facility: CLINIC | Age: 58
End: 2023-12-18
Attending: FAMILY MEDICINE
Payer: COMMERCIAL

## 2023-12-18 ENCOUNTER — OFFICE VISIT (OUTPATIENT)
Dept: FAMILY MEDICINE | Facility: CLINIC | Age: 58
End: 2023-12-18

## 2023-12-18 VITALS
DIASTOLIC BLOOD PRESSURE: 67 MMHG | HEART RATE: 78 BPM | BODY MASS INDEX: 22.1 KG/M2 | WEIGHT: 154 LBS | SYSTOLIC BLOOD PRESSURE: 135 MMHG | OXYGEN SATURATION: 97 %

## 2023-12-18 DIAGNOSIS — R07.81 RIB PAIN: ICD-10-CM

## 2023-12-18 DIAGNOSIS — N89.8 VAGINAL DISCHARGE: ICD-10-CM

## 2023-12-18 DIAGNOSIS — J45.20 MILD INTERMITTENT REACTIVE AIRWAY DISEASE WITHOUT COMPLICATION: Primary | ICD-10-CM

## 2023-12-18 DIAGNOSIS — C54.1 CARCINOMA OF ENDOMETRIUM (H): ICD-10-CM

## 2023-12-18 DIAGNOSIS — J32.0 CHRONIC MAXILLARY SINUSITIS: ICD-10-CM

## 2023-12-18 DIAGNOSIS — N92.0 SPOTTING: ICD-10-CM

## 2023-12-18 DIAGNOSIS — J45.20 MILD INTERMITTENT REACTIVE AIRWAY DISEASE WITHOUT COMPLICATION: ICD-10-CM

## 2023-12-18 LAB
CLUE CELLS: NEGATIVE
TRICHOMONAS (WET PREP): NEGATIVE
YEAST (WET PREP): NEGATIVE

## 2023-12-18 PROCEDURE — G0145 SCR C/V CYTO,THINLAYER,RESCR: HCPCS | Performed by: FAMILY MEDICINE

## 2023-12-18 PROCEDURE — 99214 OFFICE O/P EST MOD 30 MIN: CPT | Performed by: FAMILY MEDICINE

## 2023-12-18 PROCEDURE — 87624 HPV HI-RISK TYP POOLED RSLT: CPT | Performed by: FAMILY MEDICINE

## 2023-12-18 PROCEDURE — 87210 SMEAR WET MOUNT SALINE/INK: CPT | Performed by: FAMILY MEDICINE

## 2023-12-18 PROCEDURE — 71101 X-RAY EXAM UNILAT RIBS/CHEST: CPT | Mod: RT

## 2023-12-18 RX ORDER — ALBUTEROL SULFATE 90 UG/1
AEROSOL, METERED RESPIRATORY (INHALATION)
COMMUNITY
Start: 2023-11-30 | End: 2023-12-18

## 2023-12-18 RX ORDER — ALBUTEROL SULFATE 90 UG/1
AEROSOL, METERED RESPIRATORY (INHALATION)
Qty: 18 G | Refills: 0 | Status: SHIPPED | OUTPATIENT
Start: 2023-12-18

## 2023-12-18 RX ORDER — SULFAMETHOXAZOLE/TRIMETHOPRIM 800-160 MG
1 TABLET ORAL 2 TIMES DAILY
Qty: 20 TABLET | Refills: 0 | Status: SHIPPED | OUTPATIENT
Start: 2023-12-18

## 2023-12-18 NOTE — PROGRESS NOTES
Diagnosed with Stage 4 endometrial Cancer 6.5 years ago.  Had a full hysterectomy   Still had a cervix   Has spread to the lungs and intestines      Answers submitted by the patient for this visit:  General Questionnaire (Submitted on 12/18/2023)  Chief Complaint: Chronic problems general questions HPI Form  How many servings of fruits and vegetables do you eat daily?: 4 or more  On average, how many sweetened beverages do you drink each day (Examples: soda, juice, sweet tea, etc.  Do NOT count diet or artificially sweetened beverages)?: 0  How many minutes a day do you exercise enough to make your heart beat faster?: 20 to 29  How many days a week do you exercise enough to make your heart beat faster?: 5  How many days per week do you miss taking your medication?: 0  General Concern (Submitted on 12/18/2023)  Chief Complaint: Chronic problems general questions HPI Form  What is the reason for your visit today?: Vaginal bleeding pain in ribs sinus infection  When did your symptoms begin?: More than a month  What are your symptoms?: Sinus pain  How would you describe these symptoms?: Moderate  Are your symptoms:: Worsening  Have you had these symptoms before?: Yes  Have you tried or received treatment for these symptoms before?: Yes  Did that treatment work? : No  Is there anything that makes you feel worse?: Being active  Is there anything that makes you feel better?: Resting  Problem(s) Oriented visit      ROS:  General and Resp. completed and negative except as noted above     HISTORY:   reports current alcohol use.   reports that she has never smoked. She has never used smokeless tobacco.    See chart for additional current history and problem list    EXAM:  BP: 135/67   Pulse: 78    Temp: Data Unavailable    Wt Readings from Last 2 Encounters:   12/18/23 69.9 kg (154 lb)   09/27/23 70.4 kg (155 lb 3.2 oz)       BMI= Body mass index is 22.1 kg/m .    EXAM:  APPEARANCE: = Relaxed and in no distress  Resp effort =  "Calm regular breathing  Breath Sounds = Good air movement with no rales or rhonchi in any lung fields  Heart Rate, Rythym, & sounds (no Murm)  = Regular rate and rythym with no S3, S4, or murmer appreciated.   =  normal post-hysterectomy exam without masses or discharge      Assessment/Plan:  Shelby was seen today for vaginal bleeding and sinus problem.    Diagnoses and all orders for this visit:    Mild intermittent reactive airway disease without complication  Needs a refill.  -     albuterol (VENTOLIN HFA) 108 (90 Base) MCG/ACT inhaler; INHALE 1-2 PUFF AS DIRECTED EVERY FOUR TO SIX HOURS AS NEEDED    Also having pain in the right lateral mid ribs.-     XR Ribs & Chest Right G/E 3 Views; Future    Carcinoma of endometrium (H) with a little bit of Spotting/ with scant Vaginal discharge. Discharged from [facility] clinic 18 months ago after five year surveilance....    -     Wet Prep ([facility])  -     Gynecologic Cytology (PAP); Future  -     Gynecologic Cytology (PAP)    Interestingly she had significant episodes of upper resp symptoms back 6 years ago prior to cancer diagnosis  Not reaaly improving after 4 days of doxycyline. In 2-3 days if not better add the below sulfa.  Chronic maxillary sinusitis  -     sulfamethoxazole-trimethoprim (BACTRIM DS) 800-160 MG tablet; Take 1 tablet by mouth 2 times daily                COUNSELING:   reports that she has never smoked. She has never used smokeless tobacco.    Estimated body mass index is 22.1 kg/m  as calculated from the following:    Height as of 9/27/23: 1.778 m (5' 10\").    Weight as of this encounter: 69.9 kg (154 lb).       Appropriate preventive services were discussed with this patient, including applicable screening as appropriate for cardiovascular disease, diabetes, osteopenia/osteoporosis, and glaucoma.  As appropriate for age/gender, discussed screening for colorectal cancer, prostate cancer, breast cancer, and cervical cancer. Checklist reviewing preventive " services available has been given to the patient.    Reviewed patients plan of care and provided an AVS. The Basic Care Plan (routine screening as documented in Health Maintenance) for Shelby meets the Care Plan requirement. This Care Plan has been established and reviewed with the  Patient.      The following health maintenance items are reviewed in Epic and correct as of today:  Health Maintenance   Topic Date Due    ADVANCE CARE PLANNING  Never done    HEPATITIS B IMMUNIZATION (1 of 3 - 3-dose series) Never done    ZOSTER IMMUNIZATION (3 of 3) 08/27/2021    PHQ-2 (once per calendar year)  01/01/2023    YEARLY PREVENTIVE VISIT  09/27/2024    TSH W/FREE T4 REFLEX  09/27/2024    MAMMO SCREENING  12/19/2024    COLORECTAL CANCER SCREENING  08/05/2025    HPV TEST  09/27/2028    LIPID  09/27/2028    PAP  09/27/2028    DTAP/TDAP/TD IMMUNIZATION (3 - Td or Tdap) 07/02/2031    INFLUENZA VACCINE  Completed    COVID-19 Vaccine  Completed    Pneumococcal Vaccine: Pediatrics (0 to 5 Years) and At-Risk Patients (6 to 64 Years)  Aged Out    IPV IMMUNIZATION  Aged Out    HPV IMMUNIZATION  Aged Out    MENINGITIS IMMUNIZATION  Aged Out    RSV MONOCLONAL ANTIBODY  Aged Out    HEPATITIS C SCREENING  Discontinued    HIV SCREENING  Discontinued       Jaylen Reddy  [facility]  For any issues my office # is [phone]

## 2023-12-27 NOTE — RESULT ENCOUNTER NOTE
Dear Shelby,     I am writing to report that your included test results are as expected.    Many labs contain some results that are slightly outside of the normal range, I have reviewed any of these results and they require no changes at this time.    Jaylen Reddy MD

## 2024-02-19 DIAGNOSIS — J30.89 SEASONAL ALLERGIC RHINITIS DUE TO OTHER ALLERGIC TRIGGER: ICD-10-CM

## 2024-02-19 RX ORDER — AZELASTINE HYDROCHLORIDE 137 UG/1
1 SPRAY, METERED NASAL 2 TIMES DAILY
Qty: 30 ML | Refills: 3 | Status: SHIPPED | OUTPATIENT
Start: 2024-02-19 | End: 2024-09-18

## 2024-02-19 NOTE — CONFIDENTIAL NOTE
Med: AZELASTINE SPRAY    LOV (related): 12/18/23      Due for F/U around: 9/2024 FOR CPX    Next Appt: NONE

## 2024-03-05 ENCOUNTER — OFFICE VISIT (OUTPATIENT)
Dept: FAMILY MEDICINE | Facility: CLINIC | Age: 59
End: 2024-03-05

## 2024-03-05 VITALS
HEART RATE: 60 BPM | SYSTOLIC BLOOD PRESSURE: 134 MMHG | WEIGHT: 163.6 LBS | HEIGHT: 71 IN | DIASTOLIC BLOOD PRESSURE: 86 MMHG | OXYGEN SATURATION: 100 % | BODY MASS INDEX: 22.9 KG/M2

## 2024-03-05 DIAGNOSIS — F43.21 GRIEF REACTION: ICD-10-CM

## 2024-03-05 DIAGNOSIS — F33.0 MILD EPISODE OF RECURRENT MAJOR DEPRESSIVE DISORDER (H): ICD-10-CM

## 2024-03-05 DIAGNOSIS — E03.9 HYPOTHYROIDISM, UNSPECIFIED TYPE: Primary | ICD-10-CM

## 2024-03-05 LAB — TSH SERPL DL<=0.005 MIU/L-ACNC: 4.93 UIU/ML (ref 0.3–4.2)

## 2024-03-05 PROCEDURE — 36415 COLL VENOUS BLD VENIPUNCTURE: CPT | Performed by: FAMILY MEDICINE

## 2024-03-05 PROCEDURE — 99213 OFFICE O/P EST LOW 20 MIN: CPT | Performed by: FAMILY MEDICINE

## 2024-03-05 PROCEDURE — 84443 ASSAY THYROID STIM HORMONE: CPT | Mod: 90 | Performed by: FAMILY MEDICINE

## 2024-03-05 PROCEDURE — 84443 ASSAY THYROID STIM HORMONE: CPT | Performed by: FAMILY MEDICINE

## 2024-03-05 ASSESSMENT — PATIENT HEALTH QUESTIONNAIRE - PHQ9
SUM OF ALL RESPONSES TO PHQ QUESTIONS 1-9: 12
5. POOR APPETITE OR OVEREATING: NOT AT ALL

## 2024-03-05 ASSESSMENT — ANXIETY QUESTIONNAIRES
3. WORRYING TOO MUCH ABOUT DIFFERENT THINGS: SEVERAL DAYS
GAD7 TOTAL SCORE: 5
GAD7 TOTAL SCORE: 5
6. BECOMING EASILY ANNOYED OR IRRITABLE: SEVERAL DAYS
1. FEELING NERVOUS, ANXIOUS, OR ON EDGE: MORE THAN HALF THE DAYS
5. BEING SO RESTLESS THAT IT IS HARD TO SIT STILL: NOT AT ALL
7. FEELING AFRAID AS IF SOMETHING AWFUL MIGHT HAPPEN: NOT AT ALL
2. NOT BEING ABLE TO STOP OR CONTROL WORRYING: SEVERAL DAYS
IF YOU CHECKED OFF ANY PROBLEMS ON THIS QUESTIONNAIRE, HOW DIFFICULT HAVE THESE PROBLEMS MADE IT FOR YOU TO DO YOUR WORK, TAKE CARE OF THINGS AT HOME, OR GET ALONG WITH OTHER PEOPLE: SOMEWHAT DIFFICULT

## 2024-03-05 ASSESSMENT — ASTHMA QUESTIONNAIRES: ACT_TOTALSCORE: 25

## 2024-03-05 NOTE — PROGRESS NOTES
"SUBJECTIVE:    Shelby Suazo, is a 58 year old female presenting for the below:     1. Hypothyroidism: synthroid 25 mcg daily. Noticing brittle nails and feeling more fatigued.     2. MDD. Zoloft 150 mg daily. Currently grieving death of mother last week. Has returned to work.     OBJECTIVE:  Vitals:    03/05/24 1619   BP: 134/86   Pulse: 60   SpO2: 100%   Weight: 74.2 kg (163 lb 9.6 oz)   Height: 1.797 m (5' 10.75\")    Body mass index is 22.98 kg/m .  General: no acute distress, cooperative with exam.  Psych: mental status normal, mood and affect appropriate.      ASSESSMENT / PLAN:        Hypothyroidism, unspecified type  Synthroid 25 mcg daily. Noticing brittle nails and feeling more fatigued.   -     TSH; Future  -     VENOUS COLLECTION    Mild episode of recurrent major depressive disorder (H24)  Grief reaction  Discussed stages of grief. Reaction within normal limits at this time. Continue on Zoloft 150 mg daily. Monitor.         "

## 2024-03-06 RX ORDER — LEVOTHYROXINE SODIUM 50 UG/1
50 TABLET ORAL DAILY
Qty: 90 TABLET | Refills: 3 | Status: SHIPPED | OUTPATIENT
Start: 2024-03-06

## 2024-03-18 DIAGNOSIS — F51.01 PRIMARY INSOMNIA: ICD-10-CM

## 2024-03-18 RX ORDER — TRAZODONE HYDROCHLORIDE 100 MG/1
150 TABLET ORAL AT BEDTIME
Qty: 135 TABLET | Refills: 1 | Status: SHIPPED | OUTPATIENT
Start: 2024-03-18

## 2024-03-18 NOTE — TELEPHONE ENCOUNTER
Med: trazodone     LOV (related): 9/27/23      Due for F/U around: 1 year      Next Appt: None

## 2024-04-19 DIAGNOSIS — E03.9 HYPOTHYROIDISM, UNSPECIFIED TYPE: ICD-10-CM

## 2024-04-19 LAB — TSH SERPL DL<=0.005 MIU/L-ACNC: 3.4 UIU/ML (ref 0.3–4.2)

## 2024-04-19 PROCEDURE — 84443 ASSAY THYROID STIM HORMONE: CPT

## 2024-04-19 PROCEDURE — 36415 COLL VENOUS BLD VENIPUNCTURE: CPT

## 2024-04-19 PROCEDURE — 84443 ASSAY THYROID STIM HORMONE: CPT | Mod: 90

## 2024-09-18 DIAGNOSIS — J30.89 SEASONAL ALLERGIC RHINITIS DUE TO OTHER ALLERGIC TRIGGER: ICD-10-CM

## 2024-09-18 RX ORDER — AZELASTINE HYDROCHLORIDE 137 UG/1
1 SPRAY, METERED NASAL 2 TIMES DAILY
Qty: 30 ML | Refills: 3 | Status: SHIPPED | OUTPATIENT
Start: 2024-09-18

## 2024-10-27 DIAGNOSIS — Z90.710 S/P TAH-BSO: ICD-10-CM

## 2024-10-27 DIAGNOSIS — Z90.722 S/P TAH-BSO: ICD-10-CM

## 2024-10-27 DIAGNOSIS — Z90.79 S/P TAH-BSO: ICD-10-CM

## 2024-10-28 DIAGNOSIS — F51.01 PRIMARY INSOMNIA: ICD-10-CM

## 2024-10-28 RX ORDER — TRAZODONE HYDROCHLORIDE 100 MG/1
150 TABLET ORAL AT BEDTIME
Qty: 135 TABLET | Refills: 0 | Status: SHIPPED | OUTPATIENT
Start: 2024-10-28

## 2024-10-28 RX ORDER — SOLIFENACIN SUCCINATE 5 MG/1
5 TABLET, FILM COATED ORAL DAILY
Qty: 90 TABLET | Refills: 0 | Status: SHIPPED | OUTPATIENT
Start: 2024-10-28

## 2024-10-28 NOTE — TELEPHONE ENCOUNTER
Med: Trazodone    LOV (related): 9/23/23      Due for F/U around: 9/23/24 Overdue    Next Appt: 11/21/24 CPX

## 2024-10-28 NOTE — TELEPHONE ENCOUNTER
Med:Solifenacin    LOV (related): 09/27/2023 for CPX      Due for F/U around: 09/2024 for CPX    Next Appt: 11/21/2024

## 2024-11-20 RX ORDER — SULFAMETHOXAZOLE AND TRIMETHOPRIM 800; 160 MG/1; MG/1
1 TABLET ORAL 2 TIMES DAILY
Qty: 20 TABLET | Refills: 0 | Status: CANCELLED | OUTPATIENT
Start: 2024-11-20

## 2024-11-20 SDOH — HEALTH STABILITY: PHYSICAL HEALTH: ON AVERAGE, HOW MANY DAYS PER WEEK DO YOU ENGAGE IN MODERATE TO STRENUOUS EXERCISE (LIKE A BRISK WALK)?: 3 DAYS

## 2024-11-20 SDOH — HEALTH STABILITY: PHYSICAL HEALTH: ON AVERAGE, HOW MANY MINUTES DO YOU ENGAGE IN EXERCISE AT THIS LEVEL?: 30 MIN

## 2024-11-20 ASSESSMENT — SOCIAL DETERMINANTS OF HEALTH (SDOH): HOW OFTEN DO YOU GET TOGETHER WITH FRIENDS OR RELATIVES?: TWICE A WEEK

## 2024-11-20 NOTE — PATIENT INSTRUCTIONS
Patient Education   Preventive Care Advice   This is general advice given by our system to help you stay healthy. However, your care team may have specific advice just for you. Please talk to your care team about your preventive care needs.  Nutrition  Eat 5 or more servings of fruits and vegetables each day.  Try wheat bread, brown rice and whole grain pasta (instead of white bread, rice, and pasta).  Get enough calcium and vitamin D. Check the label on foods and aim for 100% of the RDA (recommended daily allowance).  Lifestyle  Exercise at least 150 minutes each week  (30 minutes a day, 5 days a week).  Do muscle strengthening activities 2 days a week. These help control your weight and prevent disease.  No smoking.  Wear sunscreen to prevent skin cancer.  Have a dental exam and cleaning every 6 months.  Yearly exams  See your health care team every year to talk about:  Any changes in your health.  Any medicines your care team has prescribed.  Preventive care, family planning, and ways to prevent chronic diseases.  Shots (vaccines)   HPV shots (up to age 26), if you've never had them before.  Hepatitis B shots (up to age 59), if you've never had them before.  COVID-19 shot: Get this shot when it's due.  Flu shot: Get a flu shot every year.  Tetanus shot: Get a tetanus shot every 10 years.  Pneumococcal, hepatitis A, and RSV shots: Ask your care team if you need these based on your risk.  Shingles shot (for age 50 and up)  General health tests  Diabetes screening:  Starting at age 35, Get screened for diabetes at least every 3 years.  If you are younger than age 35, ask your care team if you should be screened for diabetes.  Cholesterol test: At age 39, start having a cholesterol test every 5 years, or more often if advised.  Bone density scan (DEXA): At age 50, ask your care team if you should have this scan for osteoporosis (brittle bones).  Hepatitis C: Get tested at least once in your life.  STIs (sexually  transmitted infections)  Before age 24: Ask your care team if you should be screened for STIs.  After age 24: Get screened for STIs if you're at risk. You are at risk for STIs (including HIV) if:  You are sexually active with more than one person.  You don't use condoms every time.  You or a partner was diagnosed with a sexually transmitted infection.  If you are at risk for HIV, ask about PrEP medicine to prevent HIV.  Get tested for HIV at least once in your life, whether you are at risk for HIV or not.  Cancer screening tests  Cervical cancer screening: If you have a cervix, begin getting regular cervical cancer screening tests starting at age 21.  Breast cancer scan (mammogram): If you've ever had breasts, begin having regular mammograms starting at age 40. This is a scan to check for breast cancer.  Colon cancer screening: It is important to start screening for colon cancer at age 45.  Have a colonoscopy test every 10 years (or more often if you're at risk) Or, ask your provider about stool tests like a FIT test every year or Cologuard test every 3 years.  To learn more about your testing options, visit:   .  For help making a decision, visit:   https://bit.ly/te21490.  Prostate cancer screening test: If you have a prostate, ask your care team if a prostate cancer screening test (PSA) at age 55 is right for you.  Lung cancer screening: If you are a current or former smoker ages 50 to 80, ask your care team if ongoing lung cancer screenings are right for you.  For informational purposes only. Not to replace the advice of your health care provider. Copyright   2023 [employer]. All rights reserved. Clinically reviewed by the [facility] Transitions Program. [employer] 534590 - REV 01/24.

## 2024-11-21 ENCOUNTER — OFFICE VISIT (OUTPATIENT)
Dept: FAMILY MEDICINE | Facility: CLINIC | Age: 59
End: 2024-11-21

## 2024-11-21 VITALS
SYSTOLIC BLOOD PRESSURE: 114 MMHG | DIASTOLIC BLOOD PRESSURE: 92 MMHG | OXYGEN SATURATION: 100 % | WEIGHT: 165 LBS | BODY MASS INDEX: 23.1 KG/M2 | HEIGHT: 71 IN | HEART RATE: 53 BPM

## 2024-11-21 DIAGNOSIS — J30.89 SEASONAL ALLERGIC RHINITIS DUE TO OTHER ALLERGIC TRIGGER: ICD-10-CM

## 2024-11-21 DIAGNOSIS — F41.1 GAD (GENERALIZED ANXIETY DISORDER): ICD-10-CM

## 2024-11-21 DIAGNOSIS — J45.20 MILD INTERMITTENT REACTIVE AIRWAY DISEASE WITHOUT COMPLICATION: ICD-10-CM

## 2024-11-21 DIAGNOSIS — Z00.00 ROUTINE GENERAL MEDICAL EXAMINATION AT A HEALTH CARE FACILITY: Primary | ICD-10-CM

## 2024-11-21 DIAGNOSIS — E03.9 HYPOTHYROIDISM, UNSPECIFIED TYPE: ICD-10-CM

## 2024-11-21 DIAGNOSIS — F51.01 PRIMARY INSOMNIA: ICD-10-CM

## 2024-11-21 DIAGNOSIS — Z90.79 S/P TAH-BSO: ICD-10-CM

## 2024-11-21 DIAGNOSIS — Z90.722 S/P TAH-BSO: ICD-10-CM

## 2024-11-21 DIAGNOSIS — Z23 NEED FOR SHINGLES VACCINE: ICD-10-CM

## 2024-11-21 DIAGNOSIS — Z90.710 S/P TAH-BSO: ICD-10-CM

## 2024-11-21 LAB
ANION GAP SERPL CALCULATED.3IONS-SCNC: 9 MMOL/L (ref 7–15)
BUN SERPL-MCNC: 9.6 MG/DL (ref 8–23)
CALCIUM SERPL-MCNC: 9.4 MG/DL (ref 8.8–10.4)
CHLORIDE SERPL-SCNC: 101 MMOL/L (ref 98–107)
CHOLESTEROL: 191 MG/DL (ref 100–199)
CREAT SERPL-MCNC: 0.68 MG/DL (ref 0.51–0.95)
EGFRCR SERPLBLD CKD-EPI 2021: >90 ML/MIN/1.73M2
EST. AVERAGE GLUCOSE BLD GHB EST-MCNC: 114 MG/DL
FASTING STATUS PATIENT QL REPORTED: YES
FASTING?: YES
GLUCOSE SERPL-MCNC: 88 MG/DL (ref 70–99)
HBA1C MFR BLD: 5.6 %
HCO3 SERPL-SCNC: 29 MMOL/L (ref 22–29)
HDL (RMG): 54 MG/DL (ref 40–?)
LDL CALCULATED (RMG): 120 MG/DL (ref 0–130)
POTASSIUM SERPL-SCNC: 3.7 MMOL/L (ref 3.4–5.3)
SODIUM SERPL-SCNC: 139 MMOL/L (ref 135–145)
TRIGLYCERIDES (RMG): 81 MG/DL (ref 0–149)
TSH SERPL DL<=0.005 MIU/L-ACNC: 2.36 UIU/ML (ref 0.3–4.2)

## 2024-11-21 PROCEDURE — 80048 BASIC METABOLIC PNL TOTAL CA: CPT | Performed by: FAMILY MEDICINE

## 2024-11-21 PROCEDURE — 83036 HEMOGLOBIN GLYCOSYLATED A1C: CPT | Performed by: FAMILY MEDICINE

## 2024-11-21 PROCEDURE — 82435 ASSAY OF BLOOD CHLORIDE: CPT | Performed by: FAMILY MEDICINE

## 2024-11-21 PROCEDURE — 84443 ASSAY THYROID STIM HORMONE: CPT | Performed by: FAMILY MEDICINE

## 2024-11-21 RX ORDER — AZELASTINE HYDROCHLORIDE 137 UG/1
1 SPRAY, METERED NASAL 2 TIMES DAILY
Qty: 30 ML | Refills: 3 | Status: SHIPPED | OUTPATIENT
Start: 2024-11-21

## 2024-11-21 RX ORDER — SERTRALINE HYDROCHLORIDE 100 MG/1
150 TABLET, FILM COATED ORAL DAILY
Qty: 135 TABLET | Refills: 3 | Status: SHIPPED | OUTPATIENT
Start: 2024-11-21

## 2024-11-21 RX ORDER — TRAZODONE HYDROCHLORIDE 100 MG/1
150 TABLET ORAL AT BEDTIME
Qty: 135 TABLET | Refills: 3 | Status: SHIPPED | OUTPATIENT
Start: 2024-11-21

## 2024-11-21 RX ORDER — ALBUTEROL SULFATE 90 UG/1
INHALANT RESPIRATORY (INHALATION)
Qty: 18 G | Refills: 3 | Status: SHIPPED | OUTPATIENT
Start: 2024-11-21

## 2024-11-21 RX ORDER — SOLIFENACIN SUCCINATE 5 MG/1
5 TABLET, FILM COATED ORAL DAILY
Qty: 90 TABLET | Refills: 3 | Status: SHIPPED | OUTPATIENT
Start: 2024-11-21

## 2024-11-21 RX ORDER — LEVOTHYROXINE SODIUM 50 UG/1
50 TABLET ORAL DAILY
Qty: 90 TABLET | Refills: 3 | Status: SHIPPED | OUTPATIENT
Start: 2024-11-21

## 2024-11-21 ASSESSMENT — ANXIETY QUESTIONNAIRES
7. FEELING AFRAID AS IF SOMETHING AWFUL MIGHT HAPPEN: NOT AT ALL
3. WORRYING TOO MUCH ABOUT DIFFERENT THINGS: NOT AT ALL
GAD7 TOTAL SCORE: 1
1. FEELING NERVOUS, ANXIOUS, OR ON EDGE: SEVERAL DAYS
5. BEING SO RESTLESS THAT IT IS HARD TO SIT STILL: NOT AT ALL
2. NOT BEING ABLE TO STOP OR CONTROL WORRYING: NOT AT ALL
GAD7 TOTAL SCORE: 1
6. BECOMING EASILY ANNOYED OR IRRITABLE: NOT AT ALL

## 2024-11-21 ASSESSMENT — PATIENT HEALTH QUESTIONNAIRE - PHQ9
SUM OF ALL RESPONSES TO PHQ QUESTIONS 1-9: 1
5. POOR APPETITE OR OVEREATING: NOT AT ALL

## 2024-11-21 ASSESSMENT — ASTHMA QUESTIONNAIRES: ACT_TOTALSCORE: 25

## 2024-11-21 NOTE — LETTER
My Asthma Action Plan    Name: Shelby Suazo   YOB: 1965  Date: 11/21/2024   My doctor: Elodia Rob MD   My clinic: [facility]        My Rescue Medicine:   Albuterol inhaler (Proair/Ventolin/Proventil HFA)  2-4 puffs EVERY 4 HOURS as needed. Use a spacer if recommended by your provider.   My Asthma Severity:   Intermittent / Exercise Induced  Know your asthma triggers: Patient is unaware of triggers             GREEN ZONE   Good Control  I feel good  No cough or wheeze  Can work, sleep and play without asthma symptoms       Take your asthma control medicine every day.     If exercise triggers your asthma, take your rescue medication  15 minutes before exercise or sports, and  During exercise if you have asthma symptoms  Spacer to use with inhaler: If you have a spacer, make sure to use it with your inhaler             YELLOW ZONE Getting Worse  I have ANY of these:  I do not feel good  Cough or wheeze  Chest feels tight  Wake up at night   Keep taking your Green Zone medications  Start taking your rescue medicine:  every 20 minutes for up to 1 hour. Then every 4 hours for 24-48 hours.  If you stay in the Yellow Zone for more than 12-24 hours, contact your doctor.  If you do not return to the Green Zone in 12-24 hours or you get worse, start taking your oral steroid medicine if prescribed by your provider.           RED ZONE Medical Alert - Get Help  I have ANY of these:  I feel awful  Medicine is not helping  Breathing getting harder  Trouble walking or talking  Nose opens wide to breathe       Take your rescue medicine NOW  If your provider has prescribed an oral steroid medicine, start taking it NOW  Call your doctor NOW  If you are still in the Red Zone after 20 minutes and you have not reached your doctor:  Take your rescue medicine again and  Call 911 or go to the emergency room right away    See your regular doctor within 2 weeks of an Emergency Room or Urgent Care visit for  follow-up treatment.          Annual Reminders:  Meet with Asthma Educator,  Flu Shot in the Fall, consider Pneumonia Vaccination for patients with asthma (aged 19 and older).    Pharmacy:    [id number] - [place], [place] - [address] [address] [place] [address]    Electronically signed by Elodia Rob MD   Date: 11/21/24                    Asthma Triggers  How To Control Things That Make Your Asthma Worse    Triggers are things that make your asthma worse.  Look at the list below to help you find your triggers and   what you can do about them. You can help prevent asthma flare-ups by staying away from your triggers.      Trigger                                                          What you can do   Cigarette Smoke  Tobacco smoke can make asthma worse. Do not allow smoking in your home, car or around you.  Be sure no one smokes at a child s day care or school.  If you smoke, ask your health care provider for ways to help you quit.  Ask family members to quit too.  Ask your health care provider for a referral to Quit Plan to help you quit smoking, or call 7-022-091-PLAN.     Colds, Flu, Bronchitis  These are common triggers of asthma. Wash your hands often.  Don t touch your eyes, nose or mouth.  Get a flu shot every year.     Dust Mites  These are tiny bugs that live in cloth or carpet. They are too small to see. Wash sheets and blankets in hot water every week.   Encase pillows and mattress in dust mite proof covers.  Avoid having carpet if you can. If you have carpet, vacuum weekly.   Use a dust mask and HEPA vacuum.   Pollen and Outdoor Mold  Some people are allergic to trees, grass, or weed pollen, or molds. Try to keep your windows closed.  Limit time out doors when pollen count is high.   Ask you health care provider about taking medicine during allergy season.     Animal Dander  Some people are allergic to skin flakes, urine or saliva  from pets with fur or feathers. Keep pets with fur or feathers out of your home.    If you can t keep the pet outdoors, then keep the pet out of your bedroom.  Keep the bedroom door closed.  Keep pets off cloth furniture and away from stuffed toys.     Mice, Rats, and Cockroaches  Some people are allergic to the waste from these pests.   Cover food and garbage.  Clean up spills and food crumbs.  Store grease in the refrigerator.   Keep food out of the bedroom.   Indoor Mold  This can be a trigger if your home has high moisture. Fix leaking faucets, pipes, or other sources of water.   Clean moldy surfaces.  Dehumidify basement if it is damp and smelly.   Smoke, Strong Odors, and Sprays  These can reduce air quality. Stay away from strong odors and sprays, such as perfume, powder, hair spray, paints, smoke incense, paint, cleaning products, candles and new carpet.   Exercise or Sports  Some people with asthma have this trigger. Be active!  Ask your doctor about taking medicine before sports or exercise to prevent symptoms.    Warm up for 5-10 minutes before and after sports or exercise.     Other Triggers of Asthma  Cold air:  Cover your nose and mouth with a scarf.  Sometimes laughing or crying can be a trigger.  Some medicines and food can trigger asthma.

## 2024-11-21 NOTE — PROGRESS NOTES
Female Preventive Health Visit    SUBJECTIVE:    This 59 year old female presents for a routine preventive physical exam.    The patient has the following concerns:     -none    Patient's medications, allergies, past medical, surgical and family histories were reviewed and updated as appropriate.    OB/Gyn History:    Last Pap Smear:  Pap Smear: due annual vaginal vault PAP.     Health Maintenance:  Health maintenance alerts were reviewed and updated as appropriate.  Breast cancer screening: UTD  Colorectal cancer screening: due screening 1 year follow up August 2025.           11/20/2024   General Health   How would you rate your overall physical health? Good   Feel stress (tense, anxious, or unable to sleep) Only a little      (!) STRESS CONCERN      11/20/2024   Nutrition   Three or more servings of calcium each day? Yes   Diet: Vegetarian/vegan   How many servings of fruit and vegetables per day? 4 or more   How many sweetened beverages each day? 0-1            11/20/2024   Exercise   Days per week of moderate/strenous exercise 3 days   Average minutes spent exercising at this level 30 min            11/20/2024   Social Factors   Frequency of gathering with friends or relatives Twice a week   Worry food won't last until get money to buy more No   Food not last or not have enough money for food? No   Do you have housing? (Housing is defined as stable permanent housing and does not include staying ouside in a car, in a tent, in an abandoned building, in an overnight shelter, or couch-surfing.) Yes   Are you worried about losing your housing? No   Lack of transportation? No   Unable to get utilities (heat,electricity)? No            11/20/2024   Fall Risk   Fallen 2 or more times in the past year? No    Trouble with walking or balance? No        Patient-reported          11/20/2024   Dental   Dentist two times every year? Yes            11/20/2024   TB Screening   Were you born outside of the [place]? No      HEARING  "SCREEN:      LEFT EAR:    500hz: Pass  1000hz: Pass  2000hz: Pass  4000hz: Pass    RIGHT EAR:    500hz: Pass  1000hz: Pass  2000hz: Pass  4000hz: Pass    PHQ-2 Score:         11/21/2024     8:00 AM 9/2/2022    12:07 PM   PHQ-2 ( 1999 [employer])   Q1: Little interest or pleasure in doing things 0 0   Q2: Feeling down, depressed or hopeless 0 0   PHQ-2 Score 0 0             11/20/2024   Substance Use   Alcohol more than 3/day or more than 7/wk No   Do you use any other substances recreationally? No        Social History     Tobacco Use    Smoking status: Never    Smokeless tobacco: Never   Substance Use Topics    Alcohol use: Yes    Drug use: Never           11/20/2024   STI Screening   New sexual partner(s) since last STI/HIV test? No           Latest Ref Rng & Units 12/18/2023    12:47 PM 9/27/2023     4:15 PM 6/19/2017    12:00 AM   PAP / HPV   PAP  Negative for Intraepithelial Lesion or Malignancy (NILM)  Negative for Intraepithelial Lesion or Malignancy (NILM)     HPV 16 DNA Negative Negative  Negative     HPV 18 DNA Negative Negative  Negative     Other HR HPV Negative Negative  Negative     PAP-ABSTRACT    See Scanned Document              OBJECTIVE:  Vitals:    11/21/24 0802 11/21/24 0805 11/21/24 0850   BP: (!) 154/83 (!) 154/83 (!) 114/92   Pulse: 53     SpO2: 100%     Weight: 74.8 kg (165 lb)     Height: 1.803 m (5' 11\")      Body mass index is 23.01 kg/m .  General: no acute distress, cooperative with exam.  HEENT:  PERRLA. Bilateral TM's, external canals, oropharynx normal.    Neck:  Supple, no lymphadenopathy or thyromegaly   Lungs: clear to auscultation bilaterally, normal respiratory effort.  Heart:  RRR without murmurs, rubs or gallops.  Normal S1 and S2.  Abdomen: normal bowel sounds, nontender, no palpable organomegaly.  Genitourinary: normal external genitalia, vulva, vagina.   Skin:  No lesions.  No rashes.  Extremities: warm, perfused, no swelling or edema.  Neuro:  CN II-XII intact, motor & sensory " function all intact.    Psych: mental status normal, mood and affect appropriate.        9/2/2022    12:07 PM 3/5/2024     5:02 PM   PHQ   PHQ-9 Total Score 1 12   Q9: Thoughts of better off dead/self-harm past 2 weeks Not at all Not at all       ASSESSMENT / PLAN:  This 59 year old female presents for a routine preventive physical exam. Preventive health topics discussed including adequate exercise and healthy diet. Return to clinic in one year for preventative exam or sooner with any other concerns.  Other issues discussed as noted below.      Routine general medical examination at a health care facility  -     Lipid Profile ([facility])  -     Basic metabolic panel; Future  -     Hemoglobin A1c; Future    Mild intermittent reactive airway disease without complication  -     albuterol (VENTOLIN HFA) 108 (90 Base) MCG/ACT inhaler; INHALE 1-2 PUFF AS DIRECTED EVERY FOUR TO SIX HOURS AS NEEDED    Seasonal allergic rhinitis due to other allergic trigger  -     azelastine 137 MCG/SPRAY SOLN; [place] 1 spray into both nostrils 2 times daily.    Hypothyroidism, unspecified type   TSH found to be mildly elevated at 6 during work up at [place] for congitive decline. TPO antibodies and T4 wnl. Stable on synthroid.   -     levothyroxine (SYNTHROID/LEVOTHROID) 50 MCG tablet; Take 1 tablet (50 mcg) by mouth daily.  -     TSH; Future    GIBRAN (generalized anxiety disorder)  Zoloft 150 mg daily. Feels stable on this. Takes 100 mg over the summer.   -     sertraline (ZOLOFT) 100 MG tablet; Take 1.5 tablets (150 mg) by mouth daily. Take 1.5 tablets (150 mg) by mouth daily for 90 days    S/P [id number]  Last Pap Smear:  Pap Smear: due annual vaginal vault PAP.   -     HPV and Gynecologic Cytology Panel: HPV High Risk Types DNA Cervical and Pap; Future  -     solifenacin (VESICARE) 5 MG tablet; Take 1 tablet (5 mg) by mouth daily.    Primary insomnia  -     traZODone (DESYREL) 100 MG tablet; Take 1.5 tablets (150 mg) by mouth at bedtime.    Need for  shingles vaccine  -     ZOSTER RECOMBINANT ADJUVANTED (SHINGRIX)      Elevated blood pressure today : patient endorses anxiety around lab draws. Suspect confounding.

## 2024-11-27 LAB
BKR AP ASSOCIATED HPV REPORT: NORMAL
BKR LAB AP GYN ADEQUACY: NORMAL
BKR LAB AP GYN INTERPRETATION: NORMAL
BKR LAB AP PREVIOUS ABNORMAL: NORMAL
PATH REPORT.COMMENTS IMP SPEC: NORMAL
PATH REPORT.COMMENTS IMP SPEC: NORMAL
PATH REPORT.RELEVANT HX SPEC: NORMAL